# Patient Record
Sex: FEMALE | Race: ASIAN | NOT HISPANIC OR LATINO | URBAN - METROPOLITAN AREA
[De-identification: names, ages, dates, MRNs, and addresses within clinical notes are randomized per-mention and may not be internally consistent; named-entity substitution may affect disease eponyms.]

---

## 2017-12-28 PROBLEM — 386661006 FEVER: Status: ACTIVE | Noted: 2017-12-28

## 2017-12-28 PROBLEM — 49727002 COUGH: Status: ACTIVE | Noted: 2017-12-28

## 2019-08-19 PROBLEM — 444316004 SEASONAL ALLERGY: Status: ACTIVE | Noted: 2019-08-19

## 2020-06-09 ENCOUNTER — TELEPHONE ENCOUNTER (OUTPATIENT)
Dept: URBAN - METROPOLITAN AREA CLINIC 35 | Facility: CLINIC | Age: 65
End: 2020-06-09

## 2020-06-09 RX ORDER — OMEPRAZOLE 20 MG/1
1 CAPSULE 30 MINUTES BEFORE MORNING MEAL CAPSULE, DELAYED RELEASE ORAL
Qty: 90 | Refills: 1 | Status: ACTIVE | COMMUNITY
Start: 2020-01-12

## 2020-06-09 RX ORDER — OMEPRAZOLE 20 MG/1
1 CAPSULE CAPSULE, DELAYED RELEASE ORAL
Qty: 90 | Refills: 1 | Status: ACTIVE | COMMUNITY
Start: 2017-11-18

## 2020-06-09 RX ORDER — DENOSUMAB 60 MG/ML
AS DIRECTED INJECTION SUBCUTANEOUS
Qty: 1 DOSE | Refills: 3 | Status: ACTIVE | COMMUNITY
Start: 2018-11-14

## 2020-06-09 RX ORDER — DOCUSATE SODIUM 100 MG/1
2 CAPSULES CAPSULE ORAL ONCE A DAY
Qty: 60 CAPSULE | Refills: 2 | Status: ACTIVE | COMMUNITY
Start: 2017-08-11

## 2020-06-09 RX ORDER — MONTELUKAST SODIUM 10 MG/1
TAKE 1 TABLET BY MOUTH ONCE DAILY TABLET, FILM COATED ORAL
Qty: 90 TABLET | Refills: 3 | Status: ACTIVE | COMMUNITY

## 2020-06-09 RX ORDER — MIRTAZAPINE 45 MG/1
1 TABLET AT BEDTIME TABLET, FILM COATED ORAL ONCE A DAY
Status: ACTIVE | COMMUNITY

## 2020-06-09 RX ORDER — FLUTICASONE PROPIONATE 50 UG/1
1 SPRAY IN EACH NOSTRIL SPRAY, METERED NASAL BID
Qty: 1 MONTH SUPPLY | Refills: 5 | Status: ACTIVE | COMMUNITY
Start: 2019-03-24

## 2020-06-09 RX ORDER — MONTELUKAST SODIUM 10 MG/1
1 TABLET TABLET, FILM COATED ORAL ONCE A DAY
Qty: 90 TABLET | Refills: 3 | Status: ACTIVE | COMMUNITY
Start: 2019-08-20

## 2020-06-09 RX ORDER — MIRTAZAPINE 30 MG/1
1 TABLET AT BEDTIME TABLET ORAL ONCE A DAY
Qty: 90 TABLET | Refills: 3 | Status: ACTIVE | COMMUNITY
Start: 2019-08-23

## 2020-06-09 RX ORDER — DENOSUMAB 60 MG/ML
INJECT 60 MILLIGRAM SUBCUTANEOUSLY EVERY 6 MONTHS INJECTION SUBCUTANEOUS
Qty: 1 MILLILITER | Refills: 4 | Status: ACTIVE | COMMUNITY

## 2020-06-09 RX ORDER — MIRTAZAPINE 30 MG/1
1 TABLET AT BEDTIME TABLET ORAL QHS
Qty: 90 | Refills: 3 | Status: ACTIVE | COMMUNITY
Start: 2018-11-21

## 2020-06-09 RX ORDER — MIRTAZAPINE 45 MG/1
1 TABLET AT BEDTIME TABLET, FILM COATED ORAL QHS
Qty: 90 | Refills: 1 | Status: ACTIVE | COMMUNITY
Start: 2017-11-18

## 2020-06-09 RX ORDER — AZITHROMYCIN 500 MG/1
AS DIRECTED TABLET, FILM COATED ORAL DAILY
Qty: 30 | Refills: 0 | Status: ACTIVE | COMMUNITY
Start: 2017-12-28

## 2020-06-09 RX ORDER — DENOSUMAB 60 MG/ML
AS DIRECTED INJECTION SUBCUTANEOUS
Qty: 1 KIT | Refills: 3 | Status: ACTIVE | COMMUNITY
Start: 2019-07-25

## 2020-06-09 RX ORDER — FLUTICASONE PROPIONATE 50 UG/1
1 SPRAY IN EACH NOSTRIL SPRAY, METERED NASAL ONCE A DAY
Status: ACTIVE | COMMUNITY

## 2020-06-09 RX ORDER — MONTELUKAST SODIUM 10 MG/1
1 TABLET TABLET ORAL ONCE A DAY
Qty: 90 | Refills: 4
Start: 2020-06-09

## 2020-06-24 ENCOUNTER — TELEPHONE ENCOUNTER (OUTPATIENT)
Dept: URBAN - METROPOLITAN AREA CLINIC 35 | Facility: CLINIC | Age: 65
End: 2020-06-24

## 2020-06-24 RX ORDER — FLUDROCORTISONE ACETATE 0.1 MG/1
1 TABLET TABLET ORAL QAM
Qty: 180 TABLET | Refills: 1 | OUTPATIENT
Start: 2020-06-24

## 2020-11-09 ENCOUNTER — TELEPHONE ENCOUNTER (OUTPATIENT)
Dept: URBAN - METROPOLITAN AREA CLINIC 35 | Facility: CLINIC | Age: 65
End: 2020-11-09

## 2020-11-09 PROBLEM — 36923009 MAJOR DEPRESSION, SINGLE EPISODE: Status: ACTIVE | Noted: 2017-11-18

## 2020-11-09 PROBLEM — 21719001: Status: ACTIVE | Noted: 2020-06-09

## 2020-11-09 RX ORDER — MONTELUKAST SODIUM 10 MG/1
1 TABLET TABLET ORAL ONCE A DAY
Qty: 180 TABLET | Refills: 2 | OUTPATIENT
Start: 2020-11-09

## 2020-11-09 RX ORDER — MIRTAZAPINE 45 MG/1
1 TABLET AT BEDTIME TABLET, FILM COATED ORAL QHS
Qty: 90 | Refills: 1 | Status: ACTIVE | COMMUNITY
Start: 2017-11-18

## 2020-11-09 RX ORDER — DENOSUMAB 60 MG/ML
AS DIRECTED INJECTION SUBCUTANEOUS
Qty: 1 KIT | Refills: 3 | Status: ACTIVE | COMMUNITY
Start: 2019-07-25

## 2020-11-09 RX ORDER — OMEPRAZOLE 20 MG/1
1 CAPSULE 30 MINUTES BEFORE MORNING MEAL CAPSULE, DELAYED RELEASE ORAL
Qty: 90 | Refills: 1 | Status: ACTIVE | COMMUNITY
Start: 2020-01-12

## 2020-11-09 RX ORDER — FLUTICASONE PROPIONATE 50 UG/1
1 SPRAY IN EACH NOSTRIL SPRAY, METERED NASAL BID
Qty: 1 MONTH SUPPLY | Refills: 5 | Status: ACTIVE | COMMUNITY
Start: 2019-03-24

## 2020-11-09 RX ORDER — MONTELUKAST SODIUM 10 MG/1
1 TABLET TABLET, FILM COATED ORAL ONCE A DAY
Qty: 90 TABLET | Refills: 3 | Status: ACTIVE | COMMUNITY
Start: 2019-08-20

## 2020-11-09 RX ORDER — MONTELUKAST SODIUM 10 MG/1
1 TABLET TABLET ORAL ONCE A DAY
Qty: 90 | Refills: 4 | Status: ACTIVE | COMMUNITY
Start: 2020-06-09

## 2020-11-09 RX ORDER — DOCUSATE SODIUM 100 MG/1
2 CAPSULES CAPSULE ORAL ONCE A DAY
Qty: 60 CAPSULE | Refills: 2 | Status: ACTIVE | COMMUNITY
Start: 2017-08-11

## 2020-11-09 RX ORDER — FLUTICASONE PROPIONATE 50 UG/1
1 SPRAY IN EACH NOSTRIL SPRAY, METERED NASAL ONCE A DAY
Status: ACTIVE | COMMUNITY

## 2020-11-09 RX ORDER — MONTELUKAST SODIUM 10 MG/1
TAKE 1 TABLET BY MOUTH ONCE DAILY TABLET, FILM COATED ORAL
Qty: 90 TABLET | Refills: 3 | Status: ACTIVE | COMMUNITY

## 2020-11-09 RX ORDER — FLUTICASONE PROPIONATE 50 UG/1
1 SPRAY IN EACH NOSTRIL SPRAY, METERED NASAL ONCE A DAY
Qty: 6 BOTTLE | Refills: 2 | OUTPATIENT
Start: 2020-11-09

## 2020-11-09 RX ORDER — DENOSUMAB 60 MG/ML
INJECT 60 MILLIGRAM SUBCUTANEOUSLY EVERY 6 MONTHS INJECTION SUBCUTANEOUS
Qty: 1 MILLILITER | Refills: 4 | Status: ACTIVE | COMMUNITY

## 2020-11-09 RX ORDER — MIRTAZAPINE 30 MG/1
1 TABLET AT BEDTIME TABLET, FILM COATED ORAL ONCE A DAY
Qty: 180 TABLET | Refills: 2 | OUTPATIENT
Start: 2020-11-09

## 2020-11-09 RX ORDER — AZITHROMYCIN 500 MG/1
AS DIRECTED TABLET, FILM COATED ORAL DAILY
Qty: 30 | Refills: 0 | Status: ACTIVE | COMMUNITY
Start: 2017-12-28

## 2020-11-09 RX ORDER — MIRTAZAPINE 45 MG/1
1 TABLET AT BEDTIME TABLET, FILM COATED ORAL ONCE A DAY
Status: ACTIVE | COMMUNITY

## 2020-11-09 RX ORDER — ALENDRONATE SODIUM 70 MG/1
1 TABLET 30 MINUTES BEFORE THE FIRST FOOD, BEVERAGE OR MEDICINE OF THE DAY WITH PLAIN WATER TABLET ORAL
Qty: 24 TABLET | Refills: 1 | OUTPATIENT
Start: 2020-11-09

## 2020-11-09 RX ORDER — MIRTAZAPINE 30 MG/1
1 TABLET AT BEDTIME TABLET ORAL ONCE A DAY
Qty: 90 TABLET | Refills: 3 | Status: ACTIVE | COMMUNITY
Start: 2019-08-23

## 2020-11-09 RX ORDER — OMEPRAZOLE 20 MG/1
1 CAPSULE CAPSULE, DELAYED RELEASE ORAL
Qty: 90 | Refills: 1 | Status: ACTIVE | COMMUNITY
Start: 2017-11-18

## 2020-11-09 RX ORDER — MIRTAZAPINE 30 MG/1
1 TABLET AT BEDTIME TABLET ORAL QHS
Qty: 90 | Refills: 3 | Status: ACTIVE | COMMUNITY
Start: 2018-11-21

## 2020-11-09 RX ORDER — FLUDROCORTISONE ACETATE 0.1 MG/1
1 TABLET TABLET ORAL QAM
Qty: 180 TABLET | Refills: 1 | Status: ACTIVE | COMMUNITY
Start: 2020-06-24

## 2020-11-09 RX ORDER — DENOSUMAB 60 MG/ML
AS DIRECTED INJECTION SUBCUTANEOUS
Qty: 1 DOSE | Refills: 3 | Status: ACTIVE | COMMUNITY
Start: 2018-11-14

## 2020-11-28 ENCOUNTER — TELEPHONE ENCOUNTER (OUTPATIENT)
Dept: URBAN - METROPOLITAN AREA CLINIC 35 | Facility: CLINIC | Age: 65
End: 2020-11-28

## 2020-11-28 RX ORDER — VALSARTAN 40 MG/1
1 TABLET TABLET, FILM COATED ORAL QAM
Qty: 90 | Refills: 1 | OUTPATIENT
Start: 2020-11-28

## 2021-06-28 ENCOUNTER — TELEPHONE ENCOUNTER (OUTPATIENT)
Dept: URBAN - METROPOLITAN AREA CLINIC 35 | Facility: CLINIC | Age: 66
End: 2021-06-28

## 2021-06-28 PROBLEM — 59621000 ESSENTIAL HYPERTENSION: Status: ACTIVE | Noted: 2020-11-28

## 2021-06-28 RX ORDER — TELMISARTAN 40 MG/1
1 PACKET MIXED IN 6 OZ OF COOL WATER TABLET ORAL QAM
Qty: 90 | Refills: 4 | OUTPATIENT
Start: 2021-06-28

## 2021-06-29 ENCOUNTER — TELEPHONE ENCOUNTER (OUTPATIENT)
Dept: URBAN - METROPOLITAN AREA CLINIC 35 | Facility: CLINIC | Age: 66
End: 2021-06-29

## 2021-06-29 RX ORDER — TELMISARTAN 40 MG/1
1 TABLET TABLET ORAL QAM
Qty: 90 | Refills: 4 | OUTPATIENT
Start: 2021-06-29

## 2021-06-29 RX ORDER — DENOSUMAB 60 MG/ML
AS DIRECTED INJECTION SUBCUTANEOUS
Qty: 1 PRE-FILLED PEN SYRINGE | Refills: 4 | OUTPATIENT
Start: 2021-06-29

## 2021-07-28 ENCOUNTER — OFFICE VISIT (OUTPATIENT)
Dept: URBAN - METROPOLITAN AREA CLINIC 35 | Facility: CLINIC | Age: 66
End: 2021-07-28

## 2021-07-28 ENCOUNTER — LAB OUTSIDE AN ENCOUNTER (OUTPATIENT)
Dept: URBAN - METROPOLITAN AREA CLINIC 35 | Facility: CLINIC | Age: 66
End: 2021-07-28

## 2021-07-28 RX ORDER — ALENDRONATE SODIUM 70 MG/1
1 TABLET 30 MINUTES BEFORE THE FIRST FOOD, BEVERAGE OR MEDICINE OF THE DAY WITH PLAIN WATER TABLET ORAL
Qty: 24 TABLET | Refills: 1 | Status: DISCONTINUED | COMMUNITY
Start: 2020-11-09

## 2021-07-28 RX ORDER — MIRTAZAPINE 45 MG/1
1 TABLET AT BEDTIME TABLET, FILM COATED ORAL ONCE A DAY
Status: ACTIVE | COMMUNITY

## 2021-07-28 RX ORDER — VALSARTAN 40 MG/1
1 TABLET TABLET, FILM COATED ORAL QAM
Qty: 90 | Refills: 1 | Status: DISCONTINUED | COMMUNITY
Start: 2020-11-28

## 2021-07-28 RX ORDER — DOCUSATE SODIUM 100 MG/1
2 CAPSULES CAPSULE ORAL ONCE A DAY
Qty: 60 CAPSULE | Refills: 2 | Status: ACTIVE | COMMUNITY
Start: 2017-08-11

## 2021-07-28 RX ORDER — OMEPRAZOLE 20 MG/1
1 CAPSULE CAPSULE, DELAYED RELEASE ORAL
Qty: 90 | Refills: 1 | Status: ON HOLD | COMMUNITY
Start: 2017-11-18

## 2021-07-28 RX ORDER — AZITHROMYCIN 500 MG/1
AS DIRECTED TABLET, FILM COATED ORAL DAILY
Qty: 30 | Refills: 0 | Status: DISCONTINUED | COMMUNITY
Start: 2017-12-28

## 2021-07-28 RX ORDER — TELMISARTAN 40 MG/1
1 TABLET TABLET ORAL QAM
Qty: 90 | Refills: 4 | Status: ACTIVE | COMMUNITY
Start: 2021-06-29

## 2021-07-28 RX ORDER — MONTELUKAST SODIUM 10 MG/1
TAKE 1 TABLET BY MOUTH ONCE DAILY TABLET, FILM COATED ORAL
Qty: 90 TABLET | Refills: 3 | Status: ACTIVE | COMMUNITY

## 2021-07-28 RX ORDER — DENOSUMAB 60 MG/ML
INJECT 60 MILLIGRAM SUBCUTANEOUSLY EVERY 6 MONTHS INJECTION SUBCUTANEOUS
Qty: 1 MILLILITER | Refills: 4 | Status: ACTIVE | COMMUNITY

## 2021-07-28 RX ORDER — FLUTICASONE PROPIONATE 50 UG/1
1 SPRAY IN EACH NOSTRIL SPRAY, METERED NASAL BID
Qty: 1 MONTH SUPPLY | Refills: 5 | Status: ACTIVE | COMMUNITY
Start: 2019-03-24

## 2021-07-28 RX ORDER — MIRTAZAPINE 30 MG/1
1 TABLET AT BEDTIME TABLET ORAL QHS
Qty: 90 | Refills: 3 | Status: ACTIVE | COMMUNITY
Start: 2018-11-21

## 2021-07-28 NOTE — HPI-MIGRATED HPI
Colorectal cancer screening : Patient is here for -> high risk surveillance colonoscopy due to personal history of colonic polyps;   Colorectal cancer screening : Last colonoscopy was -> 4 years ago, polyps were detected and removed;   Colorectal cancer screening : Patients denies -> rectal bleeding, change in bowel habits, constipation, diarrhea, or abdominal pain;   Colorectal cancer screening : Current bowel movement patterns -> One soft BM daily;   Colorectal cancer screening : Family history of GI malignancy: -> Father with stomach cancer at age > 64 yo;   Follow up OV : Current symptoms are -> minimal heartburn, denies dysphagia;   Follow up OV : Patient is here for a routine OV for -> GERD, h/o PUD;   Follow up OV : Patient is on -> None at this time;   Follow up OV : Last OV was -> 4 years ago ([facility]);     Colorectal cancer screening : Denies dysphagia or odynophagia Currently taking anticoagulants/NSAIDs: None;

## 2021-07-28 NOTE — EXAM-MIGRATED EXAMINATIONS
GENERAL APPEARANCE: - alert, in no acute distress, well developed, well nourished;   HEAD: - normocephalic, atraumatic;   EYES: - sclera anicteric bilaterally;   EARS: - normal;   THROAT: - clear;   NECK/THYROID: - neck supple, full range of motion, no cervical lymphadenopathy, no thyroid nodules, no thyromegaly, trachea midline;   SKIN: - no suspicious lesions, warm and dry, no spider angiomata, palmar erythema or icterus;   HEART: - no murmurs, regular rate and rhythm, S1, S2 normal;   LUNGS: - clear to auscultation bilaterally, good air movement, no wheezes, rales, rhonchi;   ABDOMEN: - bowel sounds present, no masses palpable, no organomegaly , no rebound tenderness, soft, nontender, nondistended;   RECTAL: - deferred by patient;   MUSCULOSKELETAL: - ;   EXTREMITIES: - no clubbing, cyanosis, or edema;   NEUROLOGIC: - cranial nerves 2-12 grossly intact;   PSYCH: - cooperative with exam, mood/affect full range;   FOOT EXAM: -  ;   DENTAL EXAM: -  ;   BMI not documented -  ;   Weight Assessment -  ;

## 2021-08-11 ENCOUNTER — TELEPHONE ENCOUNTER (OUTPATIENT)
Dept: URBAN - METROPOLITAN AREA CLINIC 35 | Facility: CLINIC | Age: 66
End: 2021-08-11

## 2021-08-11 ENCOUNTER — OFFICE VISIT (OUTPATIENT)
Dept: URBAN - METROPOLITAN AREA SURGERY CENTER 8 | Facility: SURGERY CENTER | Age: 66
End: 2021-08-11

## 2021-08-11 RX ORDER — OMEPRAZOLE 40 MG/1
1 CAPSULE 30 MINUTES BEFORE MORNING MEAL CAPSULE, DELAYED RELEASE ORAL ONCE A DAY
Qty: 30 | Refills: 2 | OUTPATIENT
Start: 2021-08-11

## 2021-12-30 ENCOUNTER — TELEPHONE ENCOUNTER (OUTPATIENT)
Dept: URBAN - METROPOLITAN AREA CLINIC 37 | Facility: CLINIC | Age: 66
End: 2021-12-30

## 2021-12-30 ENCOUNTER — ERX REFILL RESPONSE (OUTPATIENT)
Dept: URBAN - METROPOLITAN AREA CLINIC 37 | Facility: CLINIC | Age: 66
End: 2021-12-30

## 2021-12-30 RX ORDER — MIRTAZAPINE 30 MG/1
1 TABLET AT BEDTIME TABLET, FILM COATED ORAL ONCE A DAY
Qty: 90 | Refills: 4

## 2021-12-30 RX ORDER — MIRTAZAPINE 30 MG/1
UONG 1 VIEN TRUOC KHI DI NGU *THUOC NGU *TAKE 1 TABLET AT BEDTIME TABLET, FILM COATED ORAL
Qty: 90 TABLET | Refills: 5 | OUTPATIENT

## 2021-12-30 RX ORDER — MIRTAZAPINE 30 MG/1
1 TABLET AT BEDTIME TABLET, FILM COATED ORAL ONCE A DAY
Qty: 90 | Refills: 4 | OUTPATIENT

## 2022-01-03 ENCOUNTER — TELEPHONE ENCOUNTER (OUTPATIENT)
Dept: URBAN - METROPOLITAN AREA CLINIC 36 | Facility: CLINIC | Age: 67
End: 2022-01-03

## 2022-01-10 ENCOUNTER — TELEPHONE ENCOUNTER (OUTPATIENT)
Dept: URBAN - METROPOLITAN AREA CLINIC 37 | Facility: CLINIC | Age: 67
End: 2022-01-10

## 2022-01-12 PROBLEM — 266435005 GASTRO-ESOPHAGEAL REFLUX DISEASE WITHOUT ESOPHAGITIS: Status: ACTIVE | Noted: 2017-08-11

## 2022-01-13 ENCOUNTER — OFFICE VISIT (OUTPATIENT)
Dept: URBAN - METROPOLITAN AREA CLINIC 37 | Facility: CLINIC | Age: 67
End: 2022-01-13
Payer: MEDICARE

## 2022-01-13 ENCOUNTER — DASHBOARD ENCOUNTERS (OUTPATIENT)
Age: 67
End: 2022-01-13

## 2022-01-13 ENCOUNTER — LAB OUTSIDE AN ENCOUNTER (OUTPATIENT)
Dept: URBAN - METROPOLITAN AREA CLINIC 37 | Facility: CLINIC | Age: 67
End: 2022-01-13

## 2022-01-13 VITALS — WEIGHT: 105 LBS | HEIGHT: 63 IN | BODY MASS INDEX: 18.61 KG/M2

## 2022-01-13 DIAGNOSIS — Z86.010 PERSONAL HISTORY OF COLONIC POLYPS: ICD-10-CM

## 2022-01-13 DIAGNOSIS — K22.70 BARRETT'S ESOPHAGUS WITHOUT DYSPLASIA: ICD-10-CM

## 2022-01-13 DIAGNOSIS — M81.0 AGE-RELATED OSTEOPOROSIS WITHOUT CURRENT PATHOLOGICAL FRACTURE: ICD-10-CM

## 2022-01-13 PROBLEM — 302914006 BARRETT'S ESOPHAGUS: Status: ACTIVE | Noted: 2022-01-12

## 2022-01-13 PROBLEM — 276661002 AGE-RELATED OSTEOPOROSIS: Status: ACTIVE | Noted: 2017-12-21

## 2022-01-13 PROCEDURE — 99213 OFFICE O/P EST LOW 20 MIN: CPT | Performed by: INTERNAL MEDICINE

## 2022-01-13 RX ORDER — DENOSUMAB 60 MG/ML
INJECT 60 MILLIGRAM SUBCUTANEOUSLY EVERY 6 MONTHS INJECTION SUBCUTANEOUS
Qty: 1 MILLILITER | Refills: 4 | Status: ACTIVE | COMMUNITY

## 2022-01-13 RX ORDER — PERPHENAZINE 4 MG
AS DIRECTED TABLET ORAL
Status: ACTIVE | COMMUNITY
Start: 2022-11-11

## 2022-01-13 RX ORDER — SODIUM PICOSULFATE, MAGNESIUM OXIDE, AND ANHYDROUS CITRIC ACID 10; 3.5; 12 MG/160ML; G/160ML; G/160ML
160 ML LIQUID ORAL
Qty: 320 MILLILITER | Refills: 0
Start: 2022-01-13 | End: 2022-01-15

## 2022-01-13 RX ORDER — OMEPRAZOLE 20 MG/1
1 CAPSULE 30 MINUTES BEFORE MORNING MEAL CAPSULE, DELAYED RELEASE ORAL ONCE A DAY
Qty: 90 | Refills: 4
Start: 2022-01-13

## 2022-01-13 RX ORDER — TELMISARTAN 40 MG/1
1 TABLET TABLET ORAL QAM
Qty: 90 | Refills: 4 | Status: ACTIVE | COMMUNITY
Start: 2021-06-29

## 2022-01-13 RX ORDER — MELATONIN 5 MG
1 TABLET IN THE EVENING TABLET ORAL ONCE A DAY
Status: ACTIVE | COMMUNITY

## 2022-01-13 RX ORDER — SOD SULF/POT CHLORIDE/MAG SULF 1.479 G
AS DIRECTED TABLET ORAL
Qty: 24 TABLET | Refills: 0
Start: 2022-01-13 | End: 2022-01-15

## 2022-01-13 RX ORDER — FLUTICASONE PROPIONATE 50 UG/1
1 SPRAY IN EACH NOSTRIL SPRAY, METERED NASAL BID
Qty: 1 MONTH SUPPLY | Refills: 5 | Status: ACTIVE | COMMUNITY
Start: 2019-03-24

## 2022-01-13 RX ORDER — GARLIC 180 MG
AS DIRECTED TABLET, DELAYED RELEASE (ENTERIC COATED) ORAL QAM
Status: ACTIVE | COMMUNITY

## 2022-01-13 RX ORDER — MIRTAZAPINE 30 MG/1
UONG 1 VIEN TRUOC KHI DI NGU *THUOC NGU *TAKE 1 TABLET AT BEDTIME TABLET, FILM COATED ORAL
Qty: 90 TABLET | Refills: 5 | Status: ACTIVE | COMMUNITY

## 2022-01-13 RX ORDER — OMEPRAZOLE 20 MG/1
1 CAPSULE 30 MINUTES BEFORE MORNING MEAL CAPSULE, DELAYED RELEASE ORAL QAM
Refills: 2 | Status: ACTIVE | COMMUNITY
Start: 2021-08-11

## 2022-01-13 RX ORDER — DOCUSATE SODIUM 100 MG/1
2 CAPSULES CAPSULE ORAL ONCE A DAY
Qty: 60 CAPSULE | Refills: 2 | Status: ON HOLD | COMMUNITY
Start: 2017-08-11

## 2022-01-13 RX ORDER — IBUPROFEN 200 MG
1 TABLET WITH MEALS CAPSULE ORAL TWICE A DAY
Status: ACTIVE | COMMUNITY

## 2022-01-13 RX ORDER — MONTELUKAST SODIUM 10 MG/1
TAKE 1 TABLET BY MOUTH ONCE DAILY TABLET, FILM COATED ORAL
Qty: 90 TABLET | Refills: 3 | Status: ON HOLD | COMMUNITY

## 2022-01-13 RX ORDER — DENOSUMAB 60 MG/ML
AS DIRECTED INJECTION SUBCUTANEOUS
Qty: 1 PRE-FILLED PEN SYRINGE | Refills: 2
Start: 2022-01-13 | End: 2023-07-07

## 2022-01-18 PROBLEM — 275978004 SCREENING FOR MALIGNANT NEOPLASM OF COLON: Status: ACTIVE | Noted: 2017-08-11

## 2022-01-25 PROBLEM — 428283002: Status: ACTIVE | Noted: 2021-07-28

## 2022-01-28 ENCOUNTER — OFFICE VISIT (OUTPATIENT)
Dept: URBAN - METROPOLITAN AREA SURGERY CENTER 8 | Facility: SURGERY CENTER | Age: 67
End: 2022-01-28

## 2022-02-17 ENCOUNTER — OFFICE VISIT (OUTPATIENT)
Dept: URBAN - METROPOLITAN AREA CLINIC 37 | Facility: CLINIC | Age: 67
End: 2022-02-17

## 2022-02-18 ENCOUNTER — CLAIMS CREATED FROM THE CLAIM WINDOW (OUTPATIENT)
Dept: URBAN - METROPOLITAN AREA CLINIC 4 | Facility: CLINIC | Age: 67
End: 2022-02-18
Payer: MEDICARE

## 2022-02-18 ENCOUNTER — OFFICE VISIT (OUTPATIENT)
Dept: URBAN - METROPOLITAN AREA SURGERY CENTER 8 | Facility: SURGERY CENTER | Age: 67
End: 2022-02-18
Payer: MEDICARE

## 2022-02-18 DIAGNOSIS — D12.2 ADENOMA OF ASCENDING COLON: ICD-10-CM

## 2022-02-18 DIAGNOSIS — K63.89 GASEOUS DISTENTION OF INTESTINE DETERMINED BY X-RAY: ICD-10-CM

## 2022-02-18 DIAGNOSIS — D12.2 BENIGN NEOPLASM OF ASCENDING COLON: ICD-10-CM

## 2022-02-18 DIAGNOSIS — Z86.010 ADENOMAS PERSONAL HISTORY OF COLONIC POLYPS: ICD-10-CM

## 2022-02-18 DIAGNOSIS — K63.89 BACTERIAL OVERGROWTH SYNDROME: ICD-10-CM

## 2022-02-18 PROCEDURE — 45380 COLONOSCOPY AND BIOPSY: CPT | Performed by: INTERNAL MEDICINE

## 2022-02-18 PROCEDURE — G8907 PT DOC NO EVENTS ON DISCHARG: HCPCS | Performed by: INTERNAL MEDICINE

## 2022-02-18 PROCEDURE — 88305 TISSUE EXAM BY PATHOLOGIST: CPT | Performed by: PATHOLOGY

## 2022-04-06 ENCOUNTER — TELEPHONE ENCOUNTER (OUTPATIENT)
Dept: URBAN - METROPOLITAN AREA CLINIC 37 | Facility: CLINIC | Age: 67
End: 2022-04-06

## 2022-04-06 RX ORDER — PERPHENAZINE 4 MG
AS DIRECTED TABLET ORAL
Status: ACTIVE | COMMUNITY
Start: 2022-11-11

## 2022-04-06 RX ORDER — MIRTAZAPINE 30 MG/1
UONG 1 VIEN TRUOC KHI DI NGU *THUOC NGU *TAKE 1 TABLET AT BEDTIME TABLET, FILM COATED ORAL
Qty: 90 TABLET | Refills: 5 | Status: ACTIVE | COMMUNITY

## 2022-04-06 RX ORDER — FLUTICASONE PROPIONATE 50 UG/1
1 SPRAY IN EACH NOSTRIL SPRAY, METERED NASAL TID
Qty: 2 CANISTER | Refills: 5 | OUTPATIENT
Start: 2022-04-06

## 2022-04-06 RX ORDER — MONTELUKAST SODIUM 10 MG/1
TAKE 1 TABLET BY MOUTH ONCE DAILY TABLET, FILM COATED ORAL
Qty: 90 TABLET | Refills: 3 | Status: ON HOLD | COMMUNITY

## 2022-04-06 RX ORDER — DOCUSATE SODIUM 100 MG/1
2 CAPSULES CAPSULE ORAL ONCE A DAY
Qty: 60 CAPSULE | Refills: 2 | Status: ON HOLD | COMMUNITY
Start: 2017-08-11

## 2022-04-06 RX ORDER — GARLIC 180 MG
AS DIRECTED TABLET, DELAYED RELEASE (ENTERIC COATED) ORAL QAM
Status: ACTIVE | COMMUNITY

## 2022-04-06 RX ORDER — OMEPRAZOLE 20 MG/1
1 CAPSULE 30 MINUTES BEFORE MORNING MEAL CAPSULE, DELAYED RELEASE ORAL ONCE A DAY
Qty: 90 | Refills: 4 | Status: ACTIVE | COMMUNITY
Start: 2022-01-13

## 2022-04-06 RX ORDER — TELMISARTAN 40 MG/1
1 TABLET TABLET ORAL QAM
Qty: 90 | Refills: 4 | Status: ACTIVE | COMMUNITY
Start: 2021-06-29

## 2022-04-06 RX ORDER — DENOSUMAB 60 MG/ML
AS DIRECTED INJECTION SUBCUTANEOUS
Qty: 1 PRE-FILLED PEN SYRINGE | Refills: 2 | Status: ACTIVE | COMMUNITY
Start: 2022-01-13 | End: 2023-07-07

## 2022-04-06 RX ORDER — OMEPRAZOLE 20 MG/1
1 CAPSULE 30 MINUTES BEFORE MORNING MEAL CAPSULE, DELAYED RELEASE ORAL QAM
Refills: 2 | Status: ACTIVE | COMMUNITY
Start: 2021-08-11

## 2022-04-06 RX ORDER — MELATONIN 5 MG
1 TABLET IN THE EVENING TABLET ORAL ONCE A DAY
Status: ACTIVE | COMMUNITY

## 2022-04-06 RX ORDER — DENOSUMAB 60 MG/ML
INJECT 60 MILLIGRAM SUBCUTANEOUSLY EVERY 6 MONTHS INJECTION SUBCUTANEOUS
Qty: 1 MILLILITER | Refills: 4 | Status: ACTIVE | COMMUNITY

## 2022-04-06 RX ORDER — IBUPROFEN 200 MG
1 TABLET WITH MEALS CAPSULE ORAL TWICE A DAY
Status: ACTIVE | COMMUNITY

## 2022-04-06 RX ORDER — FLUTICASONE PROPIONATE 50 UG/1
1 SPRAY IN EACH NOSTRIL SPRAY, METERED NASAL BID
Qty: 1 MONTH SUPPLY | Refills: 5 | Status: ACTIVE | COMMUNITY
Start: 2019-03-24

## 2022-06-29 ENCOUNTER — TELEPHONE ENCOUNTER (OUTPATIENT)
Dept: URBAN - METROPOLITAN AREA CLINIC 36 | Facility: CLINIC | Age: 67
End: 2022-06-29

## 2022-06-29 RX ORDER — TELMISARTAN 40 MG/1
1 TABLET TABLET ORAL QAM
Qty: 90 | Refills: 4
Start: 2021-06-29

## 2022-07-21 ENCOUNTER — ERX REFILL RESPONSE (OUTPATIENT)
Dept: URBAN - METROPOLITAN AREA CLINIC 37 | Facility: CLINIC | Age: 67
End: 2022-07-21

## 2022-07-21 RX ORDER — TELMISARTAN 40 MG/1
1 TABLET TABLET ORAL QAM
Qty: 90 | Refills: 4 | OUTPATIENT

## 2022-07-21 RX ORDER — TELMISARTAN 40 MG/1
UONG 1 VIEN MOI BUOI SANG 1 LAN *HUYET AP* TAKE 1 TABLET BY MOUTH EVERY MORNING* TABLET ORAL
Qty: 90 TABLET | Refills: 4 | OUTPATIENT

## 2022-07-28 ENCOUNTER — TELEPHONE ENCOUNTER (OUTPATIENT)
Dept: URBAN - METROPOLITAN AREA CLINIC 37 | Facility: CLINIC | Age: 67
End: 2022-07-28

## 2022-07-28 ENCOUNTER — LAB OUTSIDE AN ENCOUNTER (OUTPATIENT)
Dept: URBAN - METROPOLITAN AREA CLINIC 37 | Facility: CLINIC | Age: 67
End: 2022-07-28

## 2022-08-23 ENCOUNTER — OFFICE VISIT (OUTPATIENT)
Dept: URBAN - METROPOLITAN AREA CLINIC 38 | Facility: CLINIC | Age: 67
End: 2022-08-23
Payer: MEDICARE

## 2022-08-23 DIAGNOSIS — R10.11 ABDOMINAL BURNING SENSATION IN RIGHT UPPER QUADRANT: ICD-10-CM

## 2022-08-23 DIAGNOSIS — R10.11 RUQ ABDOMINAL PAIN: ICD-10-CM

## 2022-08-23 PROCEDURE — 76700 US EXAM ABDOM COMPLETE: CPT | Performed by: INTERNAL MEDICINE

## 2024-01-11 ENCOUNTER — TELEPHONE ENCOUNTER (OUTPATIENT)
Dept: URBAN - METROPOLITAN AREA CLINIC 35 | Facility: CLINIC | Age: 69
End: 2024-01-11

## 2024-01-11 RX ORDER — FLUTICASONE PROPIONATE 50 UG/1
1 SPRAY IN EACH NOSTRIL SPRAY, METERED NASAL TWICE DAILY
Qty: 2 APPLICATOR | Refills: 5
Start: 2022-04-06